# Patient Record
Sex: MALE | Race: WHITE
[De-identification: names, ages, dates, MRNs, and addresses within clinical notes are randomized per-mention and may not be internally consistent; named-entity substitution may affect disease eponyms.]

---

## 2020-02-20 ENCOUNTER — HOSPITAL ENCOUNTER (OUTPATIENT)
Dept: HOSPITAL 53 - M OPP | Age: 40
Discharge: HOME | End: 2020-02-20
Attending: INTERNAL MEDICINE
Payer: COMMERCIAL

## 2020-02-20 VITALS — DIASTOLIC BLOOD PRESSURE: 79 MMHG | SYSTOLIC BLOOD PRESSURE: 146 MMHG

## 2020-02-20 VITALS — WEIGHT: 187 LBS | HEIGHT: 70 IN | BODY MASS INDEX: 26.77 KG/M2

## 2020-02-20 DIAGNOSIS — R10.13: ICD-10-CM

## 2020-02-20 DIAGNOSIS — Z79.899: ICD-10-CM

## 2020-02-20 DIAGNOSIS — K64.8: Primary | ICD-10-CM

## 2020-02-20 DIAGNOSIS — K21.0: ICD-10-CM

## 2020-02-20 DIAGNOSIS — R10.84: ICD-10-CM

## 2020-02-20 PROCEDURE — 43239 EGD BIOPSY SINGLE/MULTIPLE: CPT

## 2020-02-20 PROCEDURE — 88305 TISSUE EXAM BY PATHOLOGIST: CPT

## 2020-02-20 PROCEDURE — 45378 DIAGNOSTIC COLONOSCOPY: CPT

## 2020-02-20 NOTE — ROOR
________________________________________________________________________________

Patient Name: Meaghan Lin          Procedure Date: 2/20/2020 12:46 PM

MRN: [id number]                          Account Number: [id number]

YOB: 1980              Age: 39

Room: [facility]                            Gender: Male

Note Status: Finalized                 

________________________________________________________________________________

 

Procedure:           Upper GI endoscopy

Indications:         Epigastric abdominal pain

Providers:           Jose VAZQUEZ MD

Referring MD:        GAURAV NOLASCO MD

Requesting Provider: 

Medicines:           Monitored Anesthesia Care

Complications:       No immediate complications.

________________________________________________________________________________

Procedure:           Pre-Anesthesia Assessment:

                     - The heart rate, respiratory rate, oxygen saturations, 

                     blood pressure, adequacy of pulmonary ventilation, and 

                     response to care were monitored throughout the procedure.

                     The Endoscope was introduced through the mouth, and 

                     advanced to the second part of duodenum. The upper GI 

                     endoscopy was accomplished without difficulty. The 

                     patient tolerated the procedure well.

                                                                                

Findings:

     Non-severe esophagitis was found in the lower third of the esophagus. 

     Biopsies were taken with a cold forceps for histology.

     The entire examined stomach was normal.

     The examined duodenum was normal.

                                                                                

Impression:          - Non-severe reflux esophagitis. Biopsied.

                     - Normal stomach.

                     - Normal examined duodenum.

Recommendation:      - Follow an antireflux regimen.

                     - Stop pantoprazole, Famotidine.

                     - Start: Use Prilosec (omeprazole) 20 mg PO BID.

                     - (the script was sent to your pharmacy on file)

                                                                                

 

Jose Vazquez MD

________________

Jose VAZQUEZ MD

2/20/2020 1:04:42 PM

Electronically signed by Jose VAZQUEZ MD

Number of Addenda: 0

 

Note Initiated On: 2/20/2020 12:46 PM

Estimated Blood Loss:

     Estimated blood loss: none.

## 2020-02-20 NOTE — ROOR
________________________________________________________________________________

Patient Name: Meaghan Lin          Procedure Date: 2/20/2020 12:47 PM

MRN: [id number]                          Account Number: [id number]

YOB: 1980              Age: 39

Room: [facility]                            Gender: Male

Note Status: Finalized                 

________________________________________________________________________________

 

Procedure:           Colonoscopy

Indications:         Generalized abdominal pain

Providers:           Jose VAZQUEZ MD

Referring MD:        GAURAV NOLASCO MD

Requesting Provider: 

Medicines:           Monitored Anesthesia Care

Complications:       No immediate complications.

________________________________________________________________________________

Procedure:           Pre-Anesthesia Assessment:

                     - The heart rate, respiratory rate, oxygen saturations, 

                     blood pressure, adequacy of pulmonary ventilation, and 

                     response to care were monitored throughout the procedure.

                     The Colonoscope was introduced through the anus and 

                     advanced to 10 cm into the ileum. The colonoscopy was 

                     performed without difficulty. The patient tolerated the 

                     procedure well. The quality of the bowel preparation was 

                     good.

                                                                                

Findings:

     The perianal and digital rectal examinations were normal.

     Internal hemorrhoids were found during retroflexion. The hemorrhoids were 

     medium-sized.

     The entire examined colon appeared normal.

     The terminal ileum appeared normal.

                                                                                

Impression:          - Internal hemorrhoids.

                     - The entire colon is normal.

                     - The examined portion of the ileum was normal.

                     - No specimens collected.

Recommendation:      - Use Bentyl (dicyclomine) 20 mg PO Q 4-6 hrs as needed 

                     for abdominal pain

                     - (the script was sent to your pharmacy on file)

                                                                                

 

Jose Vazquez MD

________________

Jose VAZQUEZ MD

2/20/2020 1:19:04 PM

Electronically signed by Jose VAZQUEZ MD

Number of Addenda: 0

 

Note Initiated On: 2/20/2020 12:47 PM

Estimated Blood Loss:

     Estimated blood loss: none.